# Patient Record
Sex: FEMALE | Race: WHITE | NOT HISPANIC OR LATINO | Employment: FULL TIME | URBAN - METROPOLITAN AREA
[De-identification: names, ages, dates, MRNs, and addresses within clinical notes are randomized per-mention and may not be internally consistent; named-entity substitution may affect disease eponyms.]

---

## 2018-11-09 ENCOUNTER — APPOINTMENT (OUTPATIENT)
Dept: GENERAL RADIOLOGY | Facility: HOSPITAL | Age: 53
End: 2018-11-09

## 2018-11-09 PROCEDURE — 73610 X-RAY EXAM OF ANKLE: CPT | Performed by: EMERGENCY MEDICINE

## 2018-11-09 PROCEDURE — 73630 X-RAY EXAM OF FOOT: CPT | Performed by: EMERGENCY MEDICINE

## 2021-07-30 ENCOUNTER — TRANSCRIBE ORDERS (OUTPATIENT)
Dept: PHYSICAL THERAPY | Facility: CLINIC | Age: 56
End: 2021-07-30

## 2021-07-30 DIAGNOSIS — S46.911A STRAIN OF RIGHT SHOULDER, INITIAL ENCOUNTER: Primary | ICD-10-CM

## 2021-09-13 ENCOUNTER — OFFICE VISIT (OUTPATIENT)
Dept: ORTHOPEDIC SURGERY | Facility: CLINIC | Age: 56
End: 2021-09-13

## 2021-09-13 VITALS
BODY MASS INDEX: 31.41 KG/M2 | HEART RATE: 98 BPM | DIASTOLIC BLOOD PRESSURE: 85 MMHG | WEIGHT: 184 LBS | HEIGHT: 64 IN | SYSTOLIC BLOOD PRESSURE: 132 MMHG

## 2021-09-13 DIAGNOSIS — M75.51 SUBACROMIAL BURSITIS OF RIGHT SHOULDER JOINT: ICD-10-CM

## 2021-09-13 DIAGNOSIS — M75.81 TENDINITIS OF RIGHT ROTATOR CUFF: Primary | ICD-10-CM

## 2021-09-13 PROCEDURE — 99203 OFFICE O/P NEW LOW 30 MIN: CPT | Performed by: FAMILY MEDICINE

## 2021-09-13 PROCEDURE — 20610 DRAIN/INJ JOINT/BURSA W/O US: CPT | Performed by: FAMILY MEDICINE

## 2021-09-13 RX ORDER — TRIAMCINOLONE ACETONIDE 40 MG/ML
80 INJECTION, SUSPENSION INTRA-ARTICULAR; INTRAMUSCULAR
Status: COMPLETED | OUTPATIENT
Start: 2021-09-13 | End: 2021-09-13

## 2021-09-13 RX ADMIN — TRIAMCINOLONE ACETONIDE 80 MG: 40 INJECTION, SUSPENSION INTRA-ARTICULAR; INTRAMUSCULAR at 08:54

## 2021-09-13 NOTE — PROGRESS NOTES
Procedure   Large Joint Arthrocentesis: R subacromial bursa  Date/Time: 9/13/2021 8:54 AM  Consent given by: patient  Site marked: site marked  Timeout: Immediately prior to procedure a time out was called to verify the correct patient, procedure, equipment, support staff and site/side marked as required   Supporting Documentation  Indications: pain   Procedure Details  Location: shoulder - R subacromial bursa  Preparation: Patient was prepped and draped in the usual sterile fashion  Needle size: 25 G  Approach: posterior  Medications administered: 80 mg triamcinolone acetonide 40 MG/ML (6cc of 1% lidocaine without epinepherine and 2cc of 40mg Kenalog)  Patient tolerance: patient tolerated the procedure well with no immediate complications (Blood loss negligable, pt admits to almost immediate pain reflief post injection with gentle ROM.)

## 2021-09-13 NOTE — PROGRESS NOTES
"Primary Care Sports Medicine Office Visit Note     Patient ID: Leah Aquino is a 55 y.o. female.    Chief Complaint:  Chief Complaint   Patient presents with   • Right Shoulder - Follow-up     HPI:    Ms. Leah Aquino is a 55 y.o. female who presents to the clinic today for deep achy shoulder pain. Started near 2-3 months ago, with a  Specific injury. She was pulling very large box off of a palette, and felt a pop in the shoulder. Moderate pain ever since. Now having trouble sleeping, difficulty laying on this side. Occasionally tingling and radiation down the lateral arm. Mild weakness in the R arm as well, to bra strap/brushing hair/overhead activity etc. No instability.     No past medical history on file.    No past surgical history on file.    No family history on file.  Social History     Occupational History   • Not on file   Tobacco Use   • Smoking status: Former Smoker     Quit date: 2016     Years since quittin.8   Substance and Sexual Activity   • Alcohol use: Defer   • Drug use: Not on file   • Sexual activity: Not on file      Review of Systems   Constitutional: Negative for activity change and fever.   Respiratory: Negative for cough and shortness of breath.    Cardiovascular: Negative for chest pain.   Gastrointestinal: Negative for constipation, diarrhea, nausea and vomiting.   Musculoskeletal: Positive for arthralgias.   Skin: Negative for color change and rash.   Neurological: Negative for weakness.   Hematological: Does not bruise/bleed easily.     Objective:    /85   Pulse 98   Ht 162.6 cm (64\")   Wt 83.5 kg (184 lb)   BMI 31.58 kg/m²     Physical Examination:  Physical Exam  Vitals and nursing note reviewed.   Constitutional:       General: She is not in acute distress.     Appearance: She is well-developed. She is not diaphoretic.   HENT:      Head: Normocephalic and atraumatic.   Eyes:      Conjunctiva/sclera: Conjunctivae normal.   Pulmonary:      Effort: Pulmonary effort " is normal. No respiratory distress.   Skin:     General: Skin is warm.      Capillary Refill: Capillary refill takes less than 2 seconds.   Neurological:      Mental Status: She is alert.       Right Shoulder Exam     Range of Motion   Active abduction: 120   Passive abduction: normal   Extension: normal   External rotation: normal   Forward flexion: 120   Internal rotation 0 degrees: normal     Muscle Strength   External rotation: 5/5   Supraspinatus: 4/5   Subscapularis: 5/5   Biceps: 5/5     Tests   Whitlock test: positive  Drop arm: negative  Sulcus: absent    Other   Erythema: absent  Sensation: normal  Pulse: present    Comments:  Mildly positive Diana for pain, positive resisted external rotation for pain as well, negative liftoff.  Negative [place]'s, negative scarf.  Positive Neer.  Negative speeds/Yergason's.    Cervical range of motion is full with no tenderness to palpation to the bony midline or paraspinal cervical musculature.  Spurling's maneuver to the right is negative.          Imaging and other tests:  MRI right shoulder dated from outside facility yields the following  IMPRESSION:  5 x 14 mm focus of abnormal signal along the anterior supraspinatus tendon within the critical zone likely representing tendinosis, strain, or partial tear.  No full-thickness tear.  AC joint arthropathy with some distal clavicular marrow edema suggesting active inflammation.    Assessment and Plan:    1. Tendinitis of right rotator cuff    2. Subacromial bursitis of right shoulder joint    After discussion of risks and benefits, the patient elected to proceed with corticosteroid injection to the right subacromial bursa.  The patient tolerated this procedure well without any complaints or problems. No pushing, pressing, or pulling with right upper extremity greater than 10 pounds.  Will start physical therapy for rotator cuff strengthening status post injection today.  RTC in 4 weeks for repeat evaluation.    Sourav PIMENTEL  "\"Chance\" Ki CARRERA DO, CAQSM  09/13/21  08:54 EDT    Disclaimer: Please note that areas of this note were completed with computer voice recognition software.  Quite often unanticipated grammatical, syntax, homophones, and other interpretive errors are inadvertently transcribed by the computer software. Please excuse any errors that have escaped final proofreading.  "

## 2021-09-16 ENCOUNTER — PATIENT ROUNDING (BHMG ONLY) (OUTPATIENT)
Dept: ORTHOPEDIC SURGERY | Facility: CLINIC | Age: 56
End: 2021-09-16

## 2021-09-16 NOTE — PROGRESS NOTES
September 16, 2021    Hello, may I speak with Leah Aquino?    My name is KAMILAH      I am  with [facility]  [address] [place] [address].    Before we get started may I verify your date of birth? 1965    I am calling to officially welcome you to our practice and ask about your recent visit. Is this a good time to talk? No- LEFT VOICEMAIL         Thank you, and have a great day.